# Patient Record
Sex: MALE | Race: WHITE | NOT HISPANIC OR LATINO | Employment: FULL TIME | URBAN - NONMETROPOLITAN AREA
[De-identification: names, ages, dates, MRNs, and addresses within clinical notes are randomized per-mention and may not be internally consistent; named-entity substitution may affect disease eponyms.]

---

## 2021-09-06 PROBLEM — R09.81 NASAL CONGESTION WITH RHINORRHEA: Status: ACTIVE | Noted: 2021-09-06

## 2021-09-06 PROBLEM — Z20.822 ENCOUNTER FOR LABORATORY TESTING FOR COVID-19 VIRUS: Status: ACTIVE | Noted: 2021-09-06

## 2021-09-06 PROBLEM — R51.9 FRONTAL HEADACHE: Status: ACTIVE | Noted: 2021-09-06

## 2021-09-06 PROBLEM — J98.8 VIRAL RESPIRATORY ILLNESS: Status: ACTIVE | Noted: 2021-09-06

## 2021-09-06 PROBLEM — Z78.9 NEVER USED TOBACCO: Status: ACTIVE | Noted: 2019-06-24

## 2021-09-06 PROBLEM — B97.89 VIRAL RESPIRATORY ILLNESS: Status: ACTIVE | Noted: 2021-09-06

## 2021-09-06 PROBLEM — R53.1 WEAK: Status: ACTIVE | Noted: 2021-09-06

## 2021-09-06 PROBLEM — R53.83 DECREASED ENERGY: Status: ACTIVE | Noted: 2021-09-06

## 2021-09-06 PROBLEM — Z91.89 AT INCREASED RISK OF EXPOSURE TO COVID-19 VIRUS: Status: ACTIVE | Noted: 2021-09-06

## 2021-09-06 PROBLEM — R05.9 COUGH: Status: ACTIVE | Noted: 2021-09-06

## 2021-09-06 PROBLEM — J34.89 NASAL CONGESTION WITH RHINORRHEA: Status: ACTIVE | Noted: 2021-09-06

## 2021-09-06 PROCEDURE — U0004 COV-19 TEST NON-CDC HGH THRU: HCPCS | Performed by: NURSE PRACTITIONER

## 2022-12-16 ENCOUNTER — TRANSCRIBE ORDERS (OUTPATIENT)
Dept: PHYSICAL THERAPY | Facility: HOSPITAL | Age: 42
End: 2022-12-16

## 2022-12-16 DIAGNOSIS — R52 PAIN, UNSPECIFIED: Primary | ICD-10-CM

## 2023-03-15 ENCOUNTER — HOSPITAL ENCOUNTER (OUTPATIENT)
Dept: PHYSICAL THERAPY | Facility: HOSPITAL | Age: 43
Setting detail: THERAPIES SERIES
Discharge: HOME | End: 2023-03-15
Payer: OTHER GOVERNMENT

## 2023-03-15 DIAGNOSIS — G89.29 CHRONIC LOW BACK PAIN, UNSPECIFIED BACK PAIN LATERALITY, UNSPECIFIED WHETHER SCIATICA PRESENT: ICD-10-CM

## 2023-03-15 DIAGNOSIS — R52 PAIN, UNSPECIFIED: Primary | ICD-10-CM

## 2023-03-15 DIAGNOSIS — M54.50 CHRONIC LOW BACK PAIN, UNSPECIFIED BACK PAIN LATERALITY, UNSPECIFIED WHETHER SCIATICA PRESENT: ICD-10-CM

## 2023-03-15 PROCEDURE — 97110 THERAPEUTIC EXERCISES: CPT | Performed by: PHYSICAL THERAPIST

## 2023-03-15 PROCEDURE — 97162 PT EVAL MOD COMPLEX 30 MIN: CPT | Performed by: PHYSICAL THERAPIST
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
## 2023-03-29 ENCOUNTER — HOSPITAL ENCOUNTER (OUTPATIENT)
Dept: PHYSICAL THERAPY | Facility: HOSPITAL | Age: 43
Setting detail: THERAPIES SERIES
Discharge: HOME | End: 2023-03-29
Payer: OTHER GOVERNMENT

## 2023-03-29 DIAGNOSIS — R52 PAIN, UNSPECIFIED: Primary | ICD-10-CM

## 2023-03-29 DIAGNOSIS — G89.29 CHRONIC LOW BACK PAIN, UNSPECIFIED BACK PAIN LATERALITY, UNSPECIFIED WHETHER SCIATICA PRESENT: ICD-10-CM

## 2023-03-29 DIAGNOSIS — M54.50 CHRONIC LOW BACK PAIN, UNSPECIFIED BACK PAIN LATERALITY, UNSPECIFIED WHETHER SCIATICA PRESENT: ICD-10-CM

## 2023-03-29 PROCEDURE — 97110 THERAPEUTIC EXERCISES: CPT

## 2023-03-29 NOTE — THERAPY TREATMENT NOTE
[facility]  Progress Note - Milton Galvan Outpatient Physical Therapy Ortho Treatment Note  [facility]     Patient Name: Ananth Maddox  : 1980  MRN: [id number]  Today's Date: 3/29/2023     Pt seen for 2 PT sessions  Reported Improvement:  N/A %  MD Visit: 6 months   Recheck Date: 2023    Therapy Diagnosis:  Chronic LBP        Visit Date: 2023    Visit Dx:    ICD-10-CM ICD-9-CM   1. Pain, unspecified  R52 780.96   2. Chronic low back pain, unspecified back pain laterality, unspecified whether sciatica present  M54.50 724.2    G89.29 338.29       Patient Active Problem List   Diagnosis   • Never used tobacco   • Encounter for laboratory testing for COVID-19 virus   • At increased risk of exposure to COVID-19 virus   • Frontal headache   • Viral respiratory illness   • Weak   • Decreased energy   • Cough   • Nasal congestion with rhinorrhea        Past Medical History:   Diagnosis Date   • Anxiety    • Arthritis    • Depression    • Elevated cholesterol         No past surgical history on file.                     PT Assessment/Plan     Row Name 23 1100          PT Assessment    Assessment Comments Pt reports having pain \"everywhere\"  .  Pt requires min cues for improved form and technique with LE stretches. labored with sit to stands.  Lacks proper log roll despite cueing.  Ice to go post tx.  No changes in pain rating.  -LINDSAY        PT Plan    PT Frequency 2x/week  -LINDSAY     PT Plan Comments Next visit add HL Hip ABD with tband  -LINDSAY           User Key  (r) = Recorded By, (t) = Taken By, (c) = Cosigned By    Initials Name Provider Type    Aiyana Ray PTA Physical Therapist Assistant                   OP Exercises     Row Name 23 0800             Subjective Comments    Subjective Comments States the pain is the same,  still hurting in the low back and everywhere else.  -LINDSAY         Subjective Pain    Able to rate subjective pain? yes  -LINDSAY      Pre-Treatment Pain Level 7  -LINDSAY      Post-Treatment Pain Level 7  -LINDSAY      " "   Exercise 1    Exercise Name 1 pro II LE- strength/endurance/posturing  -JW      Time 1 10 min  -JW      Additional Comments L 5.0  -JW         Exercise 2    Exercise Name 2 B st HS st  -JW      Reps 2 2  -JW      Time 2 30  -JW         Exercise 3    Exercise Name 3 B sitting piriformis S  -JW      Reps 3 2  -JW      Time 3 30 seconds  -JW         Exercise 4    Exercise Name 4 Sit to stands with Lumbar ext  -JW      Sets 4 1  -JW      Reps 4 10  -JW      Time 4 5\" hold  -JW         Exercise 5    Exercise Name 5 Bridges  -JW      Sets 5 2  -JW      Reps 5 10  -JW      Time 5 5\" hold  -JW         Exercise 6    Exercise Name 6 HL Hip ADD squeezes  -JW      Reps 6 20  -JW      Time 6 5\" hold  -JW         Exercise 7    Exercise Name 7 LTR  -JW         Exercise 8    Exercise Name 8 Log roll training  -JW            User Key  (r) = Recorded By, (t) = Taken By, (c) = Cosigned By    Initials Name Provider Type    Aiyana Ray PTA Physical Therapist Assistant                              PT OP Goals     Row Name 03/29/23 0900 03/29/23 0800       PT Short Term Goals    STG 1 I with HEP and have additions/changes by next recertification  -JW I with HEP and have additions/changes by next recertification  -JW    STG 1 Progress Ongoing  -JW --    STG 2 Patient able to show proper log roll technique.  -JW Patient able to show proper log roll technique.  -JW    STG 2 Progress Ongoing  -JW --    STG 3 Patient to be more aware of posture and posture correction techniques  -JW Patient to be more aware of posture and posture correction techniques  -JW    STG 3 Progress Ongoing  -JW --    STG 4 AROM for lumbar flexion >= 65°.  -JW AROM for lumbar flexion >= 65°.  -JW    STG 5 AROM for lumbar extension >=20°.  -JW AROM for lumbar extension >=20°.  -JW    STG 6 AROM B Lumbar SB/ROT >= 50% of range.  -JW AROM B Lumbar SB/ROT >= 50% of range.  -JW    STG 7 B LE >= 4/5.  -JW B LE >= 4/5.  -JW       Long Term Goals    LTG 1 Patient to " Santamaria.    Juan José Wallace MD  [address]  have AROM for the lumbar spine all WFL, no increase in pain.  - Patient to have AROM for the lumbar spine all WFL, no increase in pain.  -    LTG 2 B LE >= 4+/5.  -JW B LE >= 4+/5.  -    LTG 3 Patient able to perform 10 minutes of standing core stab activities with good PN and no increase in pain.  -JW Patient able to perform 10 minutes of standing core stab activities with good PN and no increase in pain.  -    LTG 4 Patient able to perform 10 minutes of seated DD core stab activities with good PN and no increase in pain.  - Patient able to perform 10 minutes of seated DD core stab activities with good PN and no increase in pain.  -    LTG 5 Patient able to perform 20 Bridges with UE \"X\" with no increase in pain.  - Patient able to perform 20 Bridges with UE \"X\" with no increase in pain.  [facility] able to show proper lifting technique floor to waist with no increase in pain.  - Patient able to show proper lifting technique floor to waist with no increase in pain.  -[facility] 7 I with final HEP.  - I with final HEP.  -       Time Calculation    PT Goal Re-Cert Due Date -- 04/05/23  -          User Key  (r) = Recorded By, (t) = Taken By, (c) = Cosigned By    Initials Name Provider Type    Aiyana Ray PTA Physical Therapist Assistant                Therapy Education  Education Details: Posture and technique with piriformis st  Given: HEP, Pain management, Posture/body mechanics  Program: Reinforced  How Provided: Verbal, Demonstration  Provided to: Patient  Level of Understanding: Verbalized              Time Calculation:   Start Time: 0828  Stop Time: 0916  Time Calculation (min): 48 min  Therapy Charges for Today     Code Description Service Date Service Provider Modifiers Qty    54337613810 HC PT THER PROC EA 15 MIN 3/29/2023 Aiyana Hanley PTA GP, CQ 3    83215827271 HC PT THER SUPP EA 15 MIN 3/29/2023 Aiyana Hanley PTA GP, CQ 1                    Aiyana Hanley, " [facility]  Pager: [phone]     PTA  3/29/2023

## 2023-03-31 ENCOUNTER — HOSPITAL ENCOUNTER (OUTPATIENT)
Dept: PHYSICAL THERAPY | Facility: HOSPITAL | Age: 43
Setting detail: THERAPIES SERIES
Discharge: HOME | End: 2023-03-31
Payer: OTHER GOVERNMENT

## 2023-03-31 DIAGNOSIS — M54.50 CHRONIC LOW BACK PAIN, UNSPECIFIED BACK PAIN LATERALITY, UNSPECIFIED WHETHER SCIATICA PRESENT: ICD-10-CM

## 2023-03-31 DIAGNOSIS — G89.29 CHRONIC LOW BACK PAIN, UNSPECIFIED BACK PAIN LATERALITY, UNSPECIFIED WHETHER SCIATICA PRESENT: ICD-10-CM

## 2023-03-31 DIAGNOSIS — R52 PAIN, UNSPECIFIED: Primary | ICD-10-CM

## 2023-03-31 PROCEDURE — 97110 THERAPEUTIC EXERCISES: CPT | Performed by: PHYSICAL THERAPIST

## 2023-03-31 NOTE — THERAPY PROGRESS REPORT/RE-CERT
Outpatient Physical Therapy Ortho Progress Note  [facility]     Patient Name: Ananth Maddox  : 1980  MRN: [id number]  Today's Date: 3/31/2023      Visit Date: 2023     Patient seen for 3 PT sessions.  Patient reports 0% of improvement.  Next MD appt: 6 months.  Recertification: 2023.     Therapy Diagnosis: Chronic LBP    Patient Active Problem List   Diagnosis   • Never used tobacco   • Encounter for laboratory testing for COVID-19 virus   • At increased risk of exposure to COVID-19 virus   • Frontal headache   • Viral respiratory illness   • Weak   • Decreased energy   • Cough   • Nasal congestion with rhinorrhea        Past Medical History:   Diagnosis Date   • Anxiety    • Arthritis    • Depression    • Elevated cholesterol         No past surgical history on file.    Visit Dx:     ICD-10-CM ICD-9-CM   1. Pain, unspecified  R52 780.96   2. Chronic low back pain, unspecified back pain laterality, unspecified whether sciatica present  M54.50 724.2    G89.29 338.29              PT Ortho     Row Name 23 0800       Subjective Comments    Subjective Comments Patient rpeorts not much improvement and still hurts all over.  -AJ       Precautions and Contraindications    Precautions/Limitations no known precautions/limitations  -AJ       Subjective Pain    Able to rate subjective pain? yes  -AJ    Pre-Treatment Pain Level 7  -AJ    Post-Treatment Pain Level 8  -AJ       Posture/Observations    Alignment Options Forward head;Cervical lordosis;Thoracic kyphosis;Rounded shoulders;Scoliosis;Lumbar lordosis;Iliac crests  -AJ    Forward Head Mild;Increased  -AJ    Thoracic Kyphosis Mild;Decreased  flattening  -AJ    Rounded Shoulders Bilateral:;Mild;Increased  -AJ    Scoliosis Mild;Increased  R sided  -AJ    Lumbar lordosis Normal  -AJ    Iliac crests Bilateral:;Normal  pelvis is level, no LLD to note.  -AJ    Posture/Observations Comments No distress,continues with pooroverall postural  awareness.  -AJ       Sensory Screen for Light Touch- Lower Quarter Clearing    L1 (inguinal area) Bilateral:;Intact  -AJ    L2 (anterior mid thigh) Bilateral:;Intact  -AJ    L3 (distal anterior thigh) Bilateral:;Intact  -AJ    L4 (medial lower leg/foot) Bilateral:;Intact  -AJ    L5 (lateral lower leg/great toe) Bilateral:;Intact  -AJ       Valentin's Signs    Superficial and non-anatomical tenderness Negative  -AJ    Simulation test Positive  -AJ    Distraction straight leg raise test (sitting vs supine) Negative  -AJ    Regional disturbances Positive  -AJ    Overreaction to examination Negative  -AJ       Head/Neck/Trunk    Trunk Extension AROM 15°  -AJ    Trunk Flexion AROM 40°  -AJ    Trunk Lt Lateral Flexion AROM 50% of range  -AJ    Trunk Rt Lateral Flexion AROM 50% of range  -AJ    Trunk Lt Rotation AROM 25% of range  -AJ    Trunk Rt Rotation AROM 25% of range  -AJ       MMT (Manual Muscle Testing)    General MMT Comments B LE 4/5 with cogwheeling and giving way  -AJ       Sensation    Sensation WNL? WNL  -AJ    Light Touch No apparent deficits  -AJ       Lower Extremity Flexibility    Hamstrings Bilateral:;Mildly limited;Moderately limited  -AJ    LE Other Flexibility Bilateral:;Moderately limited  piriformis  -AJ    LE Flexibility Comments Also has moderate limitations with paraspinals  -AJ       Pathomechanics    Spine Pathomechanics Excessive thoracic kyphosis with forward bend  -AJ       Transfers    Comment, (Transfers) I with all transfer  -AJ       Gait/Stairs (Locomotion)    Comment, (Gait/Stairs) FWB, non-antalgic gait, no assistive device, no significant deviations noted, normal arm swing with gait.  -AJ          User Key  (r) = Recorded By, (t) = Taken By, (c) = Cosigned By    Initials Name Provider Type    Dannielle Guzman, PT DPT Physical Therapist                            Therapy Education  Given: HEP, Pain management, Posture/body mechanics  Program: Reinforced  How Provided:  "Verbal  Provided to: Patient  Level of Understanding: Verbalized, Demonstrated      PT OP Goals     Row Name 03/31/23 0800          PT Short Term Goals    STG 1 I with HEP and have additions/changes by next recertification  -     STG 1 Progress Met;Ongoing  -     STG 2 Patient able to show proper log roll technique.  -     STG 2 Progress Met;Ongoing  -     STG 3 Patient to be more aware of posture and posture correction techniques  -     STG 3 Progress Ongoing  -     STG 4 AROM for lumbar flexion >= 65°.  -AJ     STG 4 Progress Not Met;Ongoing  -     STG 5 AROM for lumbar extension >=20°.  -     STG 5 Progress Not Met;Ongoing  -     STG 6 AROM B Lumbar SB/ROT >= 50% of range.  -     STG 6 Progress Partially Met;Ongoing;Progressing  -     STG 7 B LE >= 4/5.  -     STG 7 Progress Met  -        Long Term Goals    LTG 1 Patient to have AROM for the lumbar spine all WFL, no increase in pain.  -     LTG 2 B LE >= 4+/5.  -     LTG 3 Patient able to perform 10 minutes of standing core stab activities with good PN and no increase in pain.  -     LTG 4 Patient able to perform 10 minutes of seated DD core stab activities with good PN and no increase in pain.  -     LTG 5 Patient able to perform 20 Bridges with UE \"X\" with no increase in pain.  -     LTG 6 Patient able to show proper lifting technique floor to waist with no increase in pain.  -     LTG 7 I with final HEP.  -        Time Calculation    PT Goal Re-Cert Due Date 04/21/23  -           User Key  (r) = Recorded By, (t) = Taken By, (c) = Cosigned By    Initials Name Provider Type    Dannielle Guzman, PT DPT Physical Therapist              Barriers to Rehab: Include significant or possible arthritic/degenerative changes that have occurred within the spine, The chronicity of this issue.     Safety Issues: None noted.      PT Assessment/Plan     Row Name 03/31/23 0800          PT Assessment    Functional Limitations " Limitation in home management;Limitations in community activities;Performance in leisure activities;Performance in work activities  -AJ     Impairments Impaired flexibility;Impaired muscle endurance;Impaired muscle length;Impaired muscle power;Impaired postural alignment;Joint integrity;Joint mobility;Muscle strength;Pain;Poor body mechanics;Posture;Range of motion  -AJ     Assessment Comments Limited overall progress due to only 1 visit since intial evaluation. progressed exercises today without issues,. patient moves slowly through exercises. Still self limiting with ROM and srength.  -AJ     Please refer to paper survey for additional self-reported information Yes  -AJ     Rehab Potential Good  -AJ     Patient/caregiver participated in establishment of treatment plan and goals Yes  -AJ     Patient would benefit from skilled therapy intervention Yes  -AJ        PT Plan    PT Frequency 2x/week  -AJ     Predicted Duration of Therapy Intervention (PT) 4-8 more visits  -AJ     PT Plan Comments Continue with core stab and flexibility.  -AJ           User Key  (r) = Recorded By, (t) = Taken By, (c) = Cosigned By    Initials Name Provider Type    Dannielle Guzman, PT DPT Physical Therapist            Other therapeutic activities and/or exercises will be prescribed depending on the patient's progress or lack thereof.       Modalities     Row Name 03/31/23 0800             Ice    Ice Applied Yes  to go.  -KARLA            User Key  (r) = Recorded By, (t) = Taken By, (c) = Cosigned By    Initials Name Provider Type    Dannielle Guzman, PT DPT Physical Therapist               OP Exercises     Row Name 03/31/23 0800             Subjective Comments    Subjective Comments Patient rpeorts not much improvement and still hurts all over.  -KARLA         Subjective Pain    Able to rate subjective pain? yes  -AJ      Pre-Treatment Pain Level 7  -AJ      Post-Treatment Pain Level 8  -AJ         Exercise 1    Exercise Name 1  "pro II LE- strength/endurance/posturing  -AJ      Time 1 10 min  -AJ      Additional Comments L 6.0  -AJ         Exercise 2    Exercise Name 2 B St. HS S  -AJ      Reps 2 2  -AJ      Time 2 30 seconds  -AJ         Exercise 3    Exercise Name 3 B sitting piriformis S  -AJ      Reps 3 2  -AJ      Time 3 30 seconds  -AJ         Exercise 4    Exercise Name 4 Sit to stands with Lumbar ext  -AJ      Sets 4 1  -AJ      Reps 4 10  -AJ      Time 4 5-10\" hold  -AJ         Exercise 5    Exercise Name 5 HL B hip add  -AJ      Sets 5 1  -AJ      Reps 5 20  -AJ      Time 5 5\" hold  -AJ         Exercise 6    Exercise Name 6 HL B hip abd  -AJ      Sets 6 1  -AJ      Reps 6 20  -AJ      Time 6 5\" hold  -AJ      Additional Comments GTB  -AJ         Exercise 7    Exercise Name 7 HLM  -AJ      Time 7 5\" holds alt for 3 min  -AJ      Additional Comments GTB  -AJ         Exercise 8    Exercise Name 8 Bridges  -AJ      Sets 8 2  -AJ      Reps 8 10  -AJ      Time 8 5\" hold  -AJ         Exercise 9    Exercise Name 9 LTR  -AJ      Reps 9 10  -AJ      Time 9 10\" hold  -AJ            User Key  (r) = Recorded By, (t) = Taken By, (c) = Cosigned By    Initials Name Provider Type    AJ Dannielle Zavala, PT DPT Physical Therapist               All therapeutic interventions performed today were to address current functional limitations and/or deficits in addressing all physical therapy goals.                 Outcome Measure Options: Modified Oswestry  Modified Oswestry  Modified Oswestry Score/Comments: 18/50 = 36%      Time Calculation:     Start Time: 0831  Stop Time: 0916  Time Calculation (min): 45 min     Therapy Charges for Today     Code Description Service Date Service Provider Modifiers Qty    21273572218 HC PT THER SUPP EA 15 MIN 3/31/2023 Dannielle Zavala, PT DPT GP 1    88804483241 HC PT THER PROC EA 15 MIN 3/31/2023 Dannielle Zavala, PT DPT GP 3          PT G-Codes  Outcome Measure Options: Modified Oswestry  Modified " Oswestry Score/Comments: 18/50 = 36%       This document has been electronically signed by Dannielle Zavala, PT DPT, CSCS on March 31, 2023 09:52 CDT

## 2023-04-05 ENCOUNTER — APPOINTMENT (OUTPATIENT)
Dept: PHYSICAL THERAPY | Facility: HOSPITAL | Age: 43
End: 2023-04-05
Payer: OTHER GOVERNMENT

## 2023-04-07 ENCOUNTER — HOSPITAL ENCOUNTER (OUTPATIENT)
Dept: PHYSICAL THERAPY | Facility: HOSPITAL | Age: 43
Setting detail: THERAPIES SERIES
Discharge: HOME | End: 2023-04-07
Payer: OTHER GOVERNMENT

## 2023-04-07 DIAGNOSIS — G89.29 CHRONIC LOW BACK PAIN, UNSPECIFIED BACK PAIN LATERALITY, UNSPECIFIED WHETHER SCIATICA PRESENT: ICD-10-CM

## 2023-04-07 DIAGNOSIS — R52 PAIN, UNSPECIFIED: Primary | ICD-10-CM

## 2023-04-07 DIAGNOSIS — M54.50 CHRONIC LOW BACK PAIN, UNSPECIFIED BACK PAIN LATERALITY, UNSPECIFIED WHETHER SCIATICA PRESENT: ICD-10-CM

## 2023-04-07 PROCEDURE — 97110 THERAPEUTIC EXERCISES: CPT

## 2023-04-07 NOTE — THERAPY TREATMENT NOTE
Outpatient Physical Therapy Ortho Treatment Note  [facility]     Patient Name: Ananth Maddox  : 1980  MRN: [id number]  Today's Date: 2023      Visit Date: 2023     Patient has attended 4 visits  0% Improvement  MD Visit: 6  Recheck Date: 2023    Therapy Diagnosis: Chronic LPB      Visit Dx:    ICD-10-CM ICD-9-CM   1. Pain, unspecified  R52 780.96   2. Chronic low back pain, unspecified back pain laterality, unspecified whether sciatica present  M54.50 724.2    G89.29 338.29       Patient Active Problem List   Diagnosis   • Never used tobacco   • Encounter for laboratory testing for COVID-19 virus   • At increased risk of exposure to COVID-19 virus   • Frontal headache   • Viral respiratory illness   • Weak   • Decreased energy   • Cough   • Nasal congestion with rhinorrhea        Past Medical History:   Diagnosis Date   • Anxiety    • Arthritis    • Depression    • Elevated cholesterol         No past surgical history on file.     PT Ortho     Row Name 23 1200       Subjective Comments    Subjective Comments patient reports that he is doing the same as he always is. reports that his pain never goes down that it always goes up.  -       Precautions and Contraindications    Precautions/Limitations no known precautions/limitations  -       Subjective Pain    Able to rate subjective pain? yes  -    Pre-Treatment Pain Level 7  -    Post-Treatment Pain Level 8  -       Posture/Observations    Posture/Observations Comments presents in no apparent distress  -          User Key  (r) = Recorded By, (t) = Taken By, (c) = Cosigned By    Initials Name Provider Type     Tamia Rosario PTA Physical Therapist Assistant                             PT Assessment/Plan     Row Name 23 1200          PT Assessment    Assessment Comments pt is reinstructed in appropriate transab contraction. gives good effort with therex and is able to maintain a good transab contraction to  "complete. does need cueing for log roll technique.  -        PT Plan    PT Frequency 2x/week  -     Predicted Duration of Therapy Intervention (PT) 3-7 more visits  -     PT Plan Comments continue to progress core stab. add DKTC physioball rolls  -           User Key  (r) = Recorded By, (t) = Taken By, (c) = Cosigned By    Initials Name Provider Type     Tamia Rosario PTA Physical Therapist Assistant                   OP Exercises     Row Name 04/07/23 1200             Subjective Comments    Subjective Comments patient reports that he is doing the same as he always is. reports that his pain never goes down that it always goes up.  -         Subjective Pain    Able to rate subjective pain? yes  -      Pre-Treatment Pain Level 7  -      Post-Treatment Pain Level 8  -         Exercise 1    Exercise Name 1 pro II LE- strength/endurance/posturing  -      Time 1 10 min  -      Additional Comments L 6.0  -MH         Exercise 2    Exercise Name 2 B St. HS S  -MH      Reps 2 2  -      Time 2 30 sec hold  -         Exercise 3    Exercise Name 3 B sitting piriformis S  -      Reps 3 2  -MH      Time 3 30 seconds  -         Exercise 4    Exercise Name 4 Sit to stands with Lumbar ext  -      Sets 4 2  -MH      Reps 4 10  -      Time 4 5 sec hold  -         Exercise 5    Exercise Name 5 LTR  -      Reps 5 10  -      Time 5 10 sec hold  -         Exercise 6    Exercise Name 6 Hooklying Transabs  -      Cueing 6 Tactile;Verbal  -      Reps 6 20  -      Time 6 5 sec hold  -         Exercise 7    Exercise Name 7 BKLL with Transabs  -      Time 7 5\" holds alt for 3 min  -      Additional Comments GTB  -         Exercise 8    Exercise Name 8 Hooklying B Hip ABD with Tband resist  -      Reps 8 20  -      Time 8 5 sec hold  -      Additional Comments GTB  -         Exercise 9    Exercise Name 9 Bridges  -      Reps 9 20  -      Time 9 5 sec hold  -            User " "Key  (r) = Recorded By, (t) = Taken By, (c) = Cosigned By    Initials Name Provider Type     Tamia Rosario PTA Physical Therapist Assistant                              PT OP Goals     Row Name 04/07/23 1200          PT Short Term Goals    STG 1 I with HEP and have additions/changes by next recertification  -     STG 1 Progress Met;Ongoing  -     STG 2 Patient able to show proper log roll technique.  -     STG 2 Progress Met;Ongoing  -     STG 3 Patient to be more aware of posture and posture correction techniques  -     STG 3 Progress Ongoing  -     STG 4 AROM for lumbar flexion >= 65°.  -     STG 4 Progress Not Met;Ongoing  -     STG 5 AROM for lumbar extension >=20°.  -     STG 5 Progress Not Met;Ongoing  -     STG 6 AROM B Lumbar SB/ROT >= 50% of range.  -     STG 6 Progress Partially Met;Ongoing;Progressing  -     STG 7 B LE >= 4/5.  -     STG 7 Progress Met  -        Long Term Goals    LTG 1 Patient to have AROM for the lumbar spine all WFL, no increase in pain.  -     LTG 2 B LE >= 4+/5.  -     LTG 3 Patient able to perform 10 minutes of standing core stab activities with good PN and no increase in pain.  -     LTG 4 Patient able to perform 10 minutes of seated DD core stab activities with good PN and no increase in pain.  -     LTG 5 Patient able to perform 20 Bridges with UE \"X\" with no increase in pain.  -     LTG 6 Patient able to show proper lifting technique floor to waist with no increase in pain.  -     LTG 7 I with final HEP.  -        Time Calculation    PT Goal Re-Cert Due Date 04/21/23  -           User Key  (r) = Recorded By, (t) = Taken By, (c) = Cosigned By    Initials Name Provider Type     Tamia Rosario PTA Physical Therapist Assistant                Therapy Education  Education Details: transab contraction with all therex  Given: HEP, Pain management, Posture/body mechanics  Program: Reinforced  How Provided: Verbal, Demonstration  Provided " to: Patient  Level of Understanding: Verbalized, Demonstrated              Time Calculation:   Start Time: 1219  Stop Time: 1305  Time Calculation (min): 46 min  Total Timed Code Minutes- PT: 46 minute(s)  Therapy Charges for Today     Code Description Service Date Service Provider Modifiers Qty    83947705545 HC PT THER PROC EA 15 MIN 4/7/2023 Tamia Rosario PTA GP, CQ 3    40336050590 HC PT THER SUPP EA 15 MIN 4/7/2023 Tamia Rosario PTA GP, CQ 1                    Tamia Rosario PTA  4/7/2023       This document has been electronically signed by Tamia Rosario PTA on April 7, 2023 13:08 CDT

## 2023-04-12 ENCOUNTER — APPOINTMENT (OUTPATIENT)
Dept: PHYSICAL THERAPY | Facility: HOSPITAL | Age: 43
End: 2023-04-12
Payer: OTHER GOVERNMENT

## 2023-04-13 ENCOUNTER — HOSPITAL ENCOUNTER (OUTPATIENT)
Dept: PHYSICAL THERAPY | Facility: HOSPITAL | Age: 43
Setting detail: THERAPIES SERIES
Discharge: HOME | End: 2023-04-13
Payer: OTHER GOVERNMENT

## 2023-04-13 DIAGNOSIS — R52 PAIN, UNSPECIFIED: Primary | ICD-10-CM

## 2023-04-13 DIAGNOSIS — G89.29 CHRONIC LOW BACK PAIN, UNSPECIFIED BACK PAIN LATERALITY, UNSPECIFIED WHETHER SCIATICA PRESENT: ICD-10-CM

## 2023-04-13 DIAGNOSIS — M54.50 CHRONIC LOW BACK PAIN, UNSPECIFIED BACK PAIN LATERALITY, UNSPECIFIED WHETHER SCIATICA PRESENT: ICD-10-CM

## 2023-04-13 PROCEDURE — 97140 MANUAL THERAPY 1/> REGIONS: CPT

## 2023-04-13 PROCEDURE — 97110 THERAPEUTIC EXERCISES: CPT

## 2023-04-13 NOTE — THERAPY TREATMENT NOTE
Outpatient Physical Therapy Ortho Treatment Note  [facility]     Patient Name: Ananth Maddox  : 1980  MRN: [id number]  Today's Date: 2023     Pt seen for 5 PT sessions  Reported Improvement:  0 %  MD Visit: tbvaldemar  Recheck Date: 2023    Therapy Diagnosis:  Chronic LPB        Visit Date: 2023    Visit Dx:    ICD-10-CM ICD-9-CM   1. Pain, unspecified  R52 780.96   2. Chronic low back pain, unspecified back pain laterality, unspecified whether sciatica present  M54.50 724.2    G89.29 338.29       Patient Active Problem List   Diagnosis   • Never used tobacco   • Encounter for laboratory testing for COVID-19 virus   • At increased risk of exposure to COVID-19 virus   • Frontal headache   • Viral respiratory illness   • Weak   • Decreased energy   • Cough   • Nasal congestion with rhinorrhea        Past Medical History:   Diagnosis Date   • Anxiety    • Arthritis    • Depression    • Elevated cholesterol         No past surgical history on file.                     PT Assessment/Plan     Row Name 23 1400          PT Assessment    Assessment Comments Pt is slow and guarded mounting Pro II and with sit to stands.  Pt reports he turned around at work and felt a sharp pain in his back, almost lost his footing. Assessment of pelvi alignmetn reveals a posterior rotation of innominate, corrected to MET.  LLD noted with shortened LLE.  Issued heel lift for use in shoe on left. Ice to go post tx session.  No changes in pain post tx sessoin.  -LINDSAY        PT Plan    PT Frequency 2x/week  -LINDSAY     PT Plan Comments Next visit assess alignment, add DKTC over pball  -LINDSAY           User Key  (r) = Recorded By, (t) = Taken By, (c) = Cosigned By    Initials Name Provider Type    Aiyana Ray PTA Physical Therapist Assistant                   OP Exercises     Row Name 23 1400             Subjective Comments    Subjective Comments Back is more sore than I thought it would be.  -LINDSAY          "Subjective Pain    Able to rate subjective pain? yes  -JW      Pre-Treatment Pain Level 8  -JW      Post-Treatment Pain Level 8  -JW         Exercise 1    Exercise Name 1 pro II LE- strength/endurance/posturing  -JW      Time 1 10 min  -JW      Additional Comments L 7.0  -JW         Exercise 2    Exercise Name 2 B St. HS S  -JW      Reps 2 2  -JW      Time 2 30 sec hold  -JW         Exercise 3    Exercise Name 3 B sitting piriformis S  -JW      Reps 3 2  -JW      Time 3 30 seconds  -JW         Exercise 4    Exercise Name 4 B seated QL st  -JW      Sets 4 2  -JW      Reps 4 30  -JW         Exercise 5    Exercise Name 5 Pball Roll outs  -JW      Reps 5 5 sec hold alt direction every 5 seconds  -JW         Exercise 6    Exercise Name 6 Sit to stands with lumbar extension  -JW      Sets 6 2  -JW      Reps 6 10  -JW      Time 6 5\" hold  -JW            User Key  (r) = Recorded By, (t) = Taken By, (c) = Cosigned By    Initials Name Provider Type    Aiyana Ray PTA Physical Therapist Assistant                         Manual Rx (last 36 hours)     Manual Treatments     Row Name 04/13/23 1500             Manual Rx 1    Manual Rx 1 Location LLE  -JW      Manual Rx 1 Type MET  -JW      Manual Rx 1 Grade HS, shotgun  -JW      Manual Rx 1 Duration 8 min  -JW            User Key  (r) = Recorded By, (t) = Taken By, (c) = Cosigned By    Initials Name Provider Type    Aiyana Ray PTA Physical Therapist Assistant                 PT OP Goals     Row Name 04/13/23 1400          PT Short Term Goals    STG 1 I with HEP and have additions/changes by next recertification  -JW     STG 1 Progress Met;Ongoing  -JW     STG 2 Patient able to show proper log roll technique.  -JW     STG 2 Progress Met;Ongoing  -JW     STG 3 Patient to be more aware of posture and posture correction techniques  -JW     STG 3 Progress Ongoing  -JW     STG 4 AROM for lumbar flexion >= 65°.  -JW     STG 4 Progress Not Met;Ongoing  -JW     STG 5 AROM for " "lumbar extension >=20°.  -     STG 5 Progress Not Met;Ongoing  -     STG 6 AROM B Lumbar SB/ROT >= 50% of range.  -     STG 6 Progress Partially Met;Ongoing;Progressing  -     STG 7 B LE >= 4/5.  -     STG 7 Progress Met  -        Long Term Goals    LTG 1 Patient to have AROM for the lumbar spine all WFL, no increase in pain.  -     LTG 2 B LE >= 4+/5.  -     LTG 3 Patient able to perform 10 minutes of standing core stab activities with good PN and no increase in pain.  -     LTG 4 Patient able to perform 10 minutes of seated DD core stab activities with good PN and no increase in pain.  -     LTG 5 Patient able to perform 20 Bridges with UE \"X\" with no increase in pain.  -     LTG 6 Patient able to show proper lifting technique floor to waist with no increase in pain.  -     LTG 7 I with final HEP.  -        Time Calculation    PT Goal Re-Cert Due Date 04/21/23  -           User Key  (r) = Recorded By, (t) = Taken By, (c) = Cosigned By    Initials Name Provider Type    Aiyana Ray PTA Physical Therapist Assistant                               Time Calculation:   Start Time: 1428  Stop Time: 1515  Time Calculation (min): 47 min  Therapy Charges for Today     Code Description Service Date Service Provider Modifiers Qty    63189723974 HC PT MANUAL THERAPY EA 15 MIN 4/13/2023 Aiyana Hanley PTA GP, CQ 1    78313763294 HC PT THER PROC EA 15 MIN 4/13/2023 Aiyana Hanley PTA GP, CQ 2    81297203763 HC PT THER SUPP EA 15 MIN 4/13/2023 Aiyana Hanley PTA GP, CQ 1                    Aiyana Hanley PTA  4/13/2023     "

## 2023-04-14 ENCOUNTER — APPOINTMENT (OUTPATIENT)
Dept: PHYSICAL THERAPY | Facility: HOSPITAL | Age: 43
End: 2023-04-14
Payer: OTHER GOVERNMENT

## 2023-04-18 ENCOUNTER — HOSPITAL ENCOUNTER (OUTPATIENT)
Dept: PHYSICAL THERAPY | Facility: HOSPITAL | Age: 43
Setting detail: THERAPIES SERIES
Discharge: HOME | End: 2023-04-18
Payer: OTHER GOVERNMENT

## 2023-04-18 DIAGNOSIS — G89.29 CHRONIC LOW BACK PAIN, UNSPECIFIED BACK PAIN LATERALITY, UNSPECIFIED WHETHER SCIATICA PRESENT: ICD-10-CM

## 2023-04-18 DIAGNOSIS — R52 PAIN, UNSPECIFIED: Primary | ICD-10-CM

## 2023-04-18 DIAGNOSIS — M54.50 CHRONIC LOW BACK PAIN, UNSPECIFIED BACK PAIN LATERALITY, UNSPECIFIED WHETHER SCIATICA PRESENT: ICD-10-CM

## 2023-04-18 PROCEDURE — 97110 THERAPEUTIC EXERCISES: CPT

## 2023-04-18 NOTE — THERAPY TREATMENT NOTE
"    Outpatient Physical Therapy Ortho Treatment Note  [facility]     Patient Name: Ananth Maddox  : 1980  MRN: [id number]  Today's Date: 2023      Visit Date: 2023     Pt seen for 6 PT sessions  Reported Improvement:  0 %  MD Visit: tbd  Recheck Date: 2023     Therapy Diagnosis:  Chronic LPB    Visit Dx:    ICD-10-CM ICD-9-CM   1. Pain, unspecified  R52 780.96   2. Chronic low back pain, unspecified back pain laterality, unspecified whether sciatica present  M54.50 724.2    G89.29 338.29       Patient Active Problem List   Diagnosis   • Never used tobacco   • Encounter for laboratory testing for COVID-19 virus   • At increased risk of exposure to COVID-19 virus   • Frontal headache   • Viral respiratory illness   • Weak   • Decreased energy   • Cough   • Nasal congestion with rhinorrhea        Past Medical History:   Diagnosis Date   • Anxiety    • Arthritis    • Depression    • Elevated cholesterol         No past surgical history on file.     PT Ortho     Row Name 23 08       Subjective Comments    Subjective Comments states that his back is hurting today. reports that he has an appointment in [place] today after this therapy appointment.  -       Precautions and Contraindications    Precautions/Limitations no known precautions/limitations  -       Subjective Pain    Able to rate subjective pain? yes  -    Pre-Treatment Pain Level 7  -    Post-Treatment Pain Level --  \"about the same\"  -       Posture/Observations    Iliac crests Bilateral:;Normal  no LLD noted,  -    Posture/Observations Comments Leg Length Measured to be 80 cm B  -          User Key  (r) = Recorded By, (t) = Taken By, (c) = Cosigned By    Initials Name Provider Type    Tamia Rockwell, PTA Physical Therapist Assistant                             PT Assessment/Plan     Row Name 23 08          PT Assessment    Assessment Comments patient has symmetrical alignment noted this date " "with no apparent LLD noted. had patient remove heel lift secondary to symmetrical alignment as well as no LLD. he is able to complete prone planks for 10 seconds but reports difficulty. he has good form when completing planks. na romano no incresae in complaints of pain post treatment per his verbal report.  -        PT Plan    PT Frequency 2x/week  -     PT Plan Comments nxt visit DD seated Core stab  -           User Key  (r) = Recorded By, (t) = Taken By, (c) = Cosigned By    Initials Name Provider Type     Tamia Rosario, PTA Physical Therapist Assistant                   OP Exercises     Row Name 04/18/23 0800             Subjective Comments    Subjective Comments states that his back is hurting today. reports that he has an appointment in [place] today after this therapy appointment.  -         Subjective Pain    Able to rate subjective pain? yes  -      Pre-Treatment Pain Level 7  -      Post-Treatment Pain Level --  \"about the same\"  -         Exercise 1    Exercise Name 1 Pro II LE- strength, enduranec, Posturing  -      Time 1 10 min  -      Additional Comments L 7.0  -         Exercise 2    Exercise Name 2 B St. HS S  -      Reps 2 2  -      Time 2 30 sec hold  -         Exercise 3    Exercise Name 3 B Sitting Piriformis S  -      Reps 3 2  -      Time 3 30 sec hold  -         Exercise 4    Exercise Name 4 B Seated QL S  -      Reps 4 2  -      Time 4 30 sec hold  -         Exercise 5    Exercise Name 5 PBall 3 way Roll outs  -      Time 5 5 sec hold each direction alt for 3 minutes  -         Exercise 6    Exercise Name 6 sit to/from stand with Lx Ext  -      Reps 6 10  -      Time 6 5 sec hold  -         Exercise 7    Exercise Name 7 LTR  -      Reps 7 10  -      Time 7 10 sec hold  -         Exercise 8    Exercise Name 8 Bridges with arms across thest  -      Reps 8 20  -      Time 8 5 sec hold  -         Exercise 9    Exercise Name 9 " "assess alignment  -      Additional Comments symmetrical alignment, equal Leg Lengh  -         Exercise 10    Exercise Name 10 DKTC physioball Rolls  -      Reps 10 20  -MH      Time 10 5 sec hold  -         Exercise 11    Exercise Name 11 BKLL with Tband Resistance  -      Reps 11 20  -MH      Additional Comments GTB  -         Exercise 12    Exercise Name 12 Prone Planks  -      Reps 12 10  -MH      Time 12 10 sec hold  -            User Key  (r) = Recorded By, (t) = Taken By, (c) = Cosigned By    Initials Name Provider Type    Tamia Rockwell, PTA Physical Therapist Assistant                              PT OP Goals     Row Name 04/18/23 0800          PT Short Term Goals    STG 1 I with HEP and have additions/changes by next recertification  -     STG 1 Progress Met;Ongoing  -     STG 2 Patient able to show proper log roll technique.  -     STG 2 Progress Met;Ongoing  -     STG 3 Patient to be more aware of posture and posture correction techniques  -     STG 3 Progress Ongoing  -     STG 4 AROM for lumbar flexion >= 65°.  -     STG 4 Progress Not Met;Ongoing  -     STG 5 AROM for lumbar extension >=20°.  -     STG 5 Progress Not Met;Ongoing  -     STG 6 AROM B Lumbar SB/ROT >= 50% of range.  -     STG 6 Progress Partially Met;Ongoing;Progressing  -     STG 7 B LE >= 4/5.  -     STG 7 Progress Met  -        Long Term Goals    LTG 1 Patient to have AROM for the lumbar spine all WFL, no increase in pain.  -     LTG 2 B LE >= 4+/5.  -     LTG 3 Patient able to perform 10 minutes of standing core stab activities with good PN and no increase in pain.  -     LTG 4 Patient able to perform 10 minutes of seated DD core stab activities with good PN and no increase in pain.  -     LTG 5 Patient able to perform 20 Bridges with UE \"X\" with no increase in pain.  -     LTG 5 Progress Met  -     LTG 6 Patient able to show proper lifting technique floor to waist with no " increase in pain.  -     LTG 7 I with final HEP.  -        Time Calculation    PT Goal Re-Cert Due Date 04/21/23  -           User Key  (r) = Recorded By, (t) = Taken By, (c) = Cosigned By    Initials Name Provider Type    Tamia Rockwell PTA Physical Therapist Assistant                Therapy Education  Given: HEP, Pain management, Posture/body mechanics  Program: Reinforced  How Provided: Verbal, Demonstration  Provided to: Patient  Level of Understanding: Verbalized, Demonstrated              Time Calculation:   Start Time: 0830  Stop Time: 0927  Time Calculation (min): 57 min  Total Timed Code Minutes- PT: 57 minute(s)  Therapy Charges for Today     Code Description Service Date Service Provider Modifiers Qty    94396295371 HC PT THER PROC EA 15 MIN 4/18/2023 Tamia Rosario PTA GP, CQ 4    74246294986 HC PT THER SUPP EA 15 MIN 4/18/2023 Tamia Rosario PTA GP, CQ 1                    Tamia Rosario PTA  4/18/2023       This document has been electronically signed by Tamia Rosario PTA on April 18, 2023 09:41 CDT

## 2023-04-20 ENCOUNTER — APPOINTMENT (OUTPATIENT)
Dept: PHYSICAL THERAPY | Facility: HOSPITAL | Age: 43
End: 2023-04-20
Payer: OTHER GOVERNMENT

## 2023-04-26 ENCOUNTER — HOSPITAL ENCOUNTER (OUTPATIENT)
Dept: PHYSICAL THERAPY | Facility: HOSPITAL | Age: 43
Setting detail: THERAPIES SERIES
Discharge: HOME | End: 2023-04-26
Payer: OTHER GOVERNMENT

## 2023-04-26 DIAGNOSIS — G89.29 CHRONIC LOW BACK PAIN, UNSPECIFIED BACK PAIN LATERALITY, UNSPECIFIED WHETHER SCIATICA PRESENT: ICD-10-CM

## 2023-04-26 DIAGNOSIS — M54.50 CHRONIC LOW BACK PAIN, UNSPECIFIED BACK PAIN LATERALITY, UNSPECIFIED WHETHER SCIATICA PRESENT: ICD-10-CM

## 2023-04-26 DIAGNOSIS — R52 PAIN, UNSPECIFIED: Primary | ICD-10-CM

## 2023-04-26 PROCEDURE — 97110 THERAPEUTIC EXERCISES: CPT | Performed by: PHYSICAL THERAPIST

## 2023-04-26 PROCEDURE — 97530 THERAPEUTIC ACTIVITIES: CPT | Performed by: PHYSICAL THERAPIST

## 2023-04-26 NOTE — THERAPY DISCHARGE NOTE
Outpatient Physical Therapy Ortho Progress Note/Discharge Summary  [facility]     Patient Name: Ananth Maddox  : 1980  MRN: [id number]  Today's Date: 2023      Visit Date: 2023     Patient seen for 7 PT sessions.  Patient reports 0% of improvement.  Next MD appt: Summer.  Recertification: N/A.    Therapy Diagnosis: Chronic LBP        Visit Dx:    ICD-10-CM ICD-9-CM   1. Pain, unspecified  R52 780.96   2. Chronic low back pain, unspecified back pain laterality, unspecified whether sciatica present  M54.50 724.2    G89.29 338.29       Patient Active Problem List   Diagnosis   • Never used tobacco   • Encounter for laboratory testing for COVID-19 virus   • At increased risk of exposure to COVID-19 virus   • Frontal headache   • Viral respiratory illness   • Weak   • Decreased energy   • Cough   • Nasal congestion with rhinorrhea        Past Medical History:   Diagnosis Date   • Anxiety    • Arthritis    • Depression    • Elevated cholesterol         No past surgical history on file.     PT Ortho     Row Name 23 0800       Subjective Comments    Subjective Comments Patient reports physical therapy hasn't relaly helped at all. he reports 0% inporvement.  -AJ       Precautions and Contraindications    Precautions/Limitations no known precautions/limitations  -AJ       Subjective Pain    Pre-Treatment Pain Level 6  -AJ    Post-Treatment Pain Level 7  -AJ       Posture/Observations    Alignment Options Forward head;Cervical lordosis;Thoracic kyphosis;Rounded shoulders;Scoliosis;Lumbar lordosis;Iliac crests  -AJ    Forward Head Mild;Increased  -AJ    Thoracic Kyphosis Mild;Decreased  flattening  -AJ    Rounded Shoulders Bilateral:;Mild;Increased  -AJ    Scoliosis Mild;Increased  R sided  -AJ    Lumbar lordosis Normal  -AJ    Iliac crests Bilateral:;Normal  pelvis is level, no LLD to note.  -AJ    Posture/Observations Comments No distress, miproved overall postural awareness.  -AJ        Sensory Screen for Light Touch- Lower Quarter Clearing    L1 (inguinal area) Bilateral:;Intact  -AJ    L2 (anterior mid thigh) Bilateral:;Intact  -AJ    L3 (distal anterior thigh) Bilateral:;Intact  -AJ    L4 (medial lower leg/foot) Bilateral:;Intact  -AJ    L5 (lateral lower leg/great toe) Bilateral:;Intact  -AJ       Valentin's Signs    Superficial and non-anatomical tenderness Negative  -AJ    Simulation test Positive  -AJ    Distraction straight leg raise test (sitting vs supine) Negative  -AJ    Regional disturbances Positive  -AJ    Overreaction to examination Negative  -AJ       Head/Neck/Trunk    Trunk Extension AROM 5°  -AJ    Trunk Flexion AROM 30°  -AJ    Trunk Lt Lateral Flexion AROM 50% of range  -AJ    Trunk Rt Lateral Flexion AROM 50% of range  -AJ    Trunk Lt Rotation AROM 50% of range  -AJ    Trunk Rt Rotation AROM 50% of range  -AJ       MMT (Manual Muscle Testing)    General MMT Comments B LE 4+/5 with cogwheeling and giving way  -AJ       Sensation    Sensation WNL? WNL  -AJ    Light Touch No apparent deficits  -AJ       Lower Extremity Flexibility    Hamstrings Bilateral:;Mildly limited;Moderately limited  -AJ    LE Other Flexibility Bilateral:;Moderately limited  -AJ    LE Flexibility Comments Also has moderate limitations with paraspinals  -AJ       Pathomechanics    Spine Pathomechanics Excessive thoracic kyphosis with forward bend  -AJ       Transfers    Comment, (Transfers) I with all transfers, good log roll technique.  -AJ       Gait/Stairs (Locomotion)    Comment, (Gait/Stairs) FWB, non-antalgic gait, no assistive device, no significant deviations noted, normal arm swing with gait.  -AJ          User Key  (r) = Recorded By, (t) = Taken By, (c) = Cosigned By    Initials Name Provider Type    Dannielle Guzman, PT DPT Physical Therapist               Barriers to Rehab: Include significant or possible arthritic/degenerative changes that have occurred within the spine, The chronicity  of this issue.     Safety Issues: None noted.                PT Assessment/Plan     Row Name 04/26/23 0800          PT Assessment    Functional Limitations Limitation in home management;Limitations in community activities;Performance in leisure activities;Performance in work activities  -     Impairments Impaired flexibility;Impaired muscle endurance;Impaired muscle length;Impaired muscle power;Impaired postural alignment;Joint integrity;Joint mobility;Muscle strength;Pain;Poor body mechanics;Posture;Range of motion  -     Assessment Comments Patient has had minimal perdue ein AROM and remains self limiting with it. He also is self limiting with MMT due ot pain, but has improvements. Patient tolerated todays treatments well with only mild increase in pain. Patient is I with all mat table exercises. Will D/C to an I HEP today and have patient follow up with refering provider.  -AJ     Please refer to paper survey for additional self-reported information Yes  -AJ     Rehab Potential Good  -AJ     Patient/caregiver participated in establishment of treatment plan and goals Yes  -AJ     Patient would benefit from skilled therapy intervention No  -AJ        PT Plan    PT Frequency --  N/A  -AJ     PT Plan Comments D/C today with final HEP.  -AJ           User Key  (r) = Recorded By, (t) = Taken By, (c) = Cosigned By    Initials Name Provider Type    Dannielle Guzman, PT DPT Physical Therapist                 Modalities     Row Name 04/26/23 0800             Ice    Ice Applied Yes  to go  -AJ      Ice S/P Rx Yes  -AJ      Patient reports will apply ice at home to involved area Yes  -AJ            User Key  (r) = Recorded By, (t) = Taken By, (c) = Cosigned By    Initials Name Provider Type    Dannielle Guzman, PT DPT Physical Therapist                 OP Exercises     Row Name 04/26/23 0800             Subjective Comments    Subjective Comments Patient reports physical therapy hasn't relaly helped at all.  "he reports 0% inporvement.  -AJ         Subjective Pain    Able to rate subjective pain? yes  -AJ      Pre-Treatment Pain Level 6  -AJ      Post-Treatment Pain Level 7  -AJ         Exercise 1    Exercise Name 1 Pro II LE- strength, enduranec, Posturing  -AJ      Time 1 10 min  -AJ      Additional Comments L 7.0  -AJ         Exercise 2    Exercise Name 2 B St. HS S  -AJ      Reps 2 2  -AJ      Time 2 30 sec hold  -AJ         Exercise 3    Exercise Name 3 B Sitting Piriformis S  -AJ      Reps 3 2  -AJ      Time 3 30 sec hold  -AJ         Exercise 4    Exercise Name 4 B Seated QL S  -AJ      Reps 4 2  -AJ      Time 4 30 sec hold  -AJ         Exercise 5    Exercise Name 5 measurements  -AJ         Exercise 6    Exercise Name 6 Seated DD alt LAQ  -AJ      Time 6 5\" holds for 3 min  -AJ         Exercise 7    Exercise Name 7 Seated DD alt marching  -AJ      Time 7 5\" holds for 3 min  -AJ         Exercise 8    Exercise Name 8 Seated DD Tbar trunk ROT  -AJ      Sets 8 1  -AJ      Reps 8 20 each side  -AJ      Additional Comments 2# Tbar  -AJ         Exercise 9    Exercise Name 9 Fwd Step up with opp hip ext  -AJ      Sets 9 1  -AJ      Reps 9 20 each LE  -AJ      Additional Comments 6\" step  -AJ         Exercise 10    Exercise Name 10 B Lateral step up with opp hip abd  -AJ      Sets 10 1  -AJ      Reps 10 15 each LE  -AJ      Additional Comments 6\" step  -AJ         Exercise 11    Exercise Name 11 Discussion of lifitng techniques.  -AJ            User Key  (r) = Recorded By, (t) = Taken By, (c) = Cosigned By    Initials Name Provider Type    Dannielle Guzman, PT DPT Physical Therapist               All therapeutic interventions performed today were to address current functional limitations and/or deficits in addressing all physical therapy goals.                    PT OP Goals     Row Name 04/26/23 0800          PT Short Term Goals    STG 1 I with HEP and have additions/changes by next recertification  -AJ     STG 1 " "Progress Met  -     STG 2 Patient able to show proper log roll technique.  -     STG 2 Progress Met  -     STG 3 Patient to be more aware of posture and posture correction techniques  -     STG 3 Progress Met  -     STG 4 AROM for lumbar flexion >= 65°.  -     STG 4 Progress Not Met  -     STG 5 AROM for lumbar extension >=20°.  -     STG 5 Progress Not Met  -     STG 6 AROM B Lumbar SB/ROT >= 50% of range.  -     STG 6 Progress Met  -     STG 7 B LE >= 4/5.  -     STG 7 Progress Met  -        Long Term Goals    LTG 1 Patient to have AROM for the lumbar spine all WFL, no increase in pain.  -     LTG 1 Progress Not Met  -     LTG 2 B LE >= 4+/5.  -     LTG 2 Progress Met  -     LTG 3 Patient able to perform 10 minutes of standing core stab activities with good PN and no increase in pain.  -     LTG 3 Progress Met  -     LTG 4 Patient able to perform 10 minutes of seated DD core stab activities with good PN and no increase in pain.  -     LTG 4 Progress Met  -     LTG 5 Patient able to perform 20 Bridges with UE \"X\" with no increase in pain.  -     LTG 5 Progress Met  -     LTG 6 Patient able to show proper lifting technique floor to waist with no increase in pain.  -     LTG 6 Progress Partially Met  -     LTG 7 I with final HEP.  -     LTG 7 Progress Met  -        Time Calculation    PT Goal Re-Cert Due Date --  N/A  -           User Key  (r) = Recorded By, (t) = Taken By, (c) = Cosigned By    Initials Name Provider Type    Dannielle Guzman, PT DPT Physical Therapist                     Outcome Measure Options: Modified Oswestry  Modified Oswestry  Modified Oswestry Score/Comments: 22/50 = 44%      Time Calculation:   Start Time: 0832  Stop Time: 0926  Time Calculation (min): 54 min  Total Timed Code Minutes- PT: 54 minute(s)  Therapy Charges for Today     Code Description Service Date Service Provider Modifiers Qty    44114976085  PT THER SUPP EA 15 " MIN 4/26/2023 Dannielle Zavala, PT DPT GP 1    65242009088  PT THERAPEUTIC ACT EA 15 MIN 4/26/2023 Dannielle Zavala, PT DPT GP 1    50786696126 HC PT THER PROC EA 15 MIN 4/26/2023 Dannielle Zavala PT DPT GP 3          PT G-Codes  Outcome Measure Options: Modified Oswestry  Modified Oswestry Score/Comments: 22/50 = 44%     OP PT Discharge Summary  Date of Discharge: 04/26/23  Reason for Discharge: Independent  Outcomes Achieved: Patient able to partially acheive established goals, Refer to plan of care for updates on goals achieved  Discharge Destination: Home with home program  Discharge Instructions/Additional Comments: Patient instructed to follow up with referring provider.      This document has been electronically signed by Dannielle Zavala PT GRACIET, CSCS on April 26, 2023 09:44 CDT

## 2023-04-27 ENCOUNTER — APPOINTMENT (OUTPATIENT)
Dept: PHYSICAL THERAPY | Facility: HOSPITAL | Age: 43
End: 2023-04-27
Payer: OTHER GOVERNMENT